# Patient Record
Sex: MALE | Race: WHITE | NOT HISPANIC OR LATINO | Employment: OTHER | URBAN - METROPOLITAN AREA
[De-identification: names, ages, dates, MRNs, and addresses within clinical notes are randomized per-mention and may not be internally consistent; named-entity substitution may affect disease eponyms.]

---

## 2017-12-19 NOTE — PATIENT DISCUSSION
Patient has elected to defer YAG capsulotomy for now, and will call if the patient elects to proceed.

## 2017-12-19 NOTE — PATIENT DISCUSSION
Pt reports he is considered pre-diabetic.  Ed pt this may be the cause for the dot hemorrhage.  Will send letter to Dr. Sharp March to evaluate.  No treatment needed at this time for the hem.  Monitor 6 months with DFE.  Pt moving to [place].  May need to follow-up there.

## 2018-02-06 NOTE — PROCEDURE NOTE: SURGICAL
Prior to commencing surgery patient identification, surgical procedure, site, and side were confirmed by Dr. Rose Garrison. Following topical proparacaine anesthesia, the patient was positioned at the YAG laser, a contact lens coupled to the cornea with methylcellulose and an axial posterior capsulotomy performed without complication using 3.2 Mj x 20. Excess methylcellulose was washed from the eye, one drop of Alphagan was instilled and the patient returned to the holding area having tolerated the procedure well and without complication. <br />[id number]

## 2018-02-06 NOTE — PATIENT DISCUSSION
Pt reports he is considered pre-diabetic.  Ed pt this may be the cause for the dot hemorrhage.  Will send letter to Dr. Brittany Salazar to evaluate.  No treatment needed at this time for the hem.  Monitor 6 months with DFE.  Pt moving to [place].  May need to follow-up there.

## 2018-02-06 NOTE — PATIENT DISCUSSION
Pt reports he is considered pre-diabetic.  Ed pt this may be the cause for the dot hemorrhage.  Will send letter to Dr. Juanice Ormond to evaluate.  No treatment needed at this time for the hem.  Monitor 6 months with DFE.  Pt moving to [place].  May need to follow-up there.

## 2018-02-13 NOTE — PROCEDURE NOTE: SURGICAL
Prior to commencing surgery patient identification, surgical procedure, site, and side were confirmed by Dr. Jerel Booker. Following topical proparacaine anesthesia, the patient was positioned at the YAG laser, a contact lens coupled to the cornea with methylcellulose and an axial posterior capsulotomy performed without complication using 3.0 Mj x 22. Excess methylcellulose was washed from the eye, one drop of Alphagan was instilled and the patient returned to the holding area having tolerated the procedure well and without complication. <br />[id number]

## 2018-02-13 NOTE — PATIENT DISCUSSION
Pt reports he is considered pre-diabetic.  Ed pt this may be the cause for the dot hemorrhage.  Will send letter to Dr. Ender Ho to evaluate.  No treatment needed at this time for the hem.  Monitor 6 months with DFE.  Pt moving to [place].  May need to follow-up there.

## 2018-02-13 NOTE — PATIENT DISCUSSION
Pt reports he is considered pre-diabetic.  Ed pt this may be the cause for the dot hemorrhage.  Will send letter to Dr. Braeden Dominguez to evaluate.  No treatment needed at this time for the hem.  Monitor 6 months with DFE.  Pt moving to [place].  May need to follow-up there.

## 2018-02-20 NOTE — PATIENT DISCUSSION
Pt reports he is considered pre-diabetic.  Ed pt this may be the cause for the dot hemorrhage.  Will send letter to Dr. Tung Francisco to evaluate.  No treatment needed at this time for the hem.  Monitor 6 months with DFE.  Pt moving to [place].  May need to follow-up there.

## 2021-12-02 ENCOUNTER — NEW PATIENT (OUTPATIENT)
Dept: URBAN - METROPOLITAN AREA CLINIC 36 | Facility: CLINIC | Age: 66
End: 2021-12-02

## 2021-12-02 DIAGNOSIS — H02.832: ICD-10-CM

## 2021-12-02 DIAGNOSIS — H02.834: ICD-10-CM

## 2021-12-02 DIAGNOSIS — H02.831: ICD-10-CM

## 2021-12-02 DIAGNOSIS — H02.835: ICD-10-CM

## 2021-12-02 PROCEDURE — 99203 OFFICE O/P NEW LOW 30 MIN: CPT

## 2021-12-02 PROCEDURE — 92285 EXTERNAL OCULAR PHOTOGRAPHY: CPT

## 2021-12-02 ASSESSMENT — VISUAL ACUITY
OS_CC: 20/25
OD_CC: 20/20-2